# Patient Record
(demographics unavailable — no encounter records)

---

## 2024-11-11 NOTE — REVIEW OF SYSTEMS
[Chest Discomfort] : chest discomfort [Negative] : Heme/Lymph [Weight Gain (___ Lbs)] : no recent weight gain [Weight Loss (___ Lbs)] : [unfilled] ~Ulb weight loss [SOB] : no shortness of breath [Dyspnea on exertion] : not dyspnea during exertion [Lower Ext Edema] : no extremity edema [Leg Claudication] : no intermittent leg claudication [Palpitations] : no palpitations [Syncope] : no syncope

## 2024-11-11 NOTE — DISCUSSION/SUMMARY
[FreeTextEntry1] : The patient is an 82-year-old female DM, HTN, HLD, CAD, AS s/p aVR with weight loss secondary to caregiver stress. #1 CV- ECG unchanged, Pharm stress neg, #2 DM: HbA1c ok,trulicity, metformin and glimeperide , f/u endo #3 AS: AVR, ECHO next visit, c/w toprol 25mg to current 50mg and encourage hydration. #4 HTN: c/w metoprolol #5 HLD: c/w atorvastatin #6 General- antibiotic prophylaxis for dental work, received Pfizer vaccine, needs to start walking for exercise, stress mgmt. Dtr very involved. [EKG obtained to assist in diagnosis and management of assessed problem(s)] : EKG obtained to assist in diagnosis and management of assessed problem(s)

## 2024-11-11 NOTE — HISTORY OF PRESENT ILLNESS
[FreeTextEntry1] : Mary continues to be under stress with husbands progressive dementia. Losing weight from dietary changes and mild depression/stress as caregiver. Dtr with her and check on them often. No CP palpitations or SOB>

## 2024-11-11 NOTE — DISCUSSION/SUMMARY
[FreeTextEntry1] : The patient is an 82-year-old female DM, HTN, HLD, CAD, AS s/p aVR with weight loss secondary to caregiver stress. #1 CV- ECG unchanged, Pharm stress neg, #2 DM: HbA1c ok,trulicity, metformin and glimeperide , f/u endo #3 AS: AVR, ECHO next visit, c/w toprol 25mg to current 50mg and encourage hydration. #4 HTN: c/w metoprolol #5 HLD: c/w atorvastatin #6 General- antibiotic prophylaxis for dental work, received Pfizer vaccine, needs to start walking for exercise, stress mgmt. Dtr very involved. [EKG obtained to assist in diagnosis and management of assessed problem(s)] : EKG obtained to assist in diagnosis and management of assessed problem(s) General

## 2025-05-19 NOTE — HISTORY OF PRESENT ILLNESS
[FreeTextEntry1] : 83-year-old female DM, HTN, HLD, CAD, AS s/p aVR who had episode palpitation for 3 hours on April 10th.  has Alzheimer's and she is the only caregiver. Now with mild memory loss herself. Dtr only one who can help but her  also has Parkinsons.

## 2025-05-19 NOTE — REVIEW OF SYSTEMS
[Weight Loss (___ Lbs)] : [unfilled] ~Ulb weight loss [Chest Discomfort] : chest discomfort [Negative] : Heme/Lymph [Weight Gain (___ Lbs)] : no recent weight gain [SOB] : no shortness of breath [Dyspnea on exertion] : not dyspnea during exertion [Lower Ext Edema] : no extremity edema [Leg Claudication] : no intermittent leg claudication [Palpitations] : no palpitations [Syncope] : no syncope

## 2025-05-19 NOTE — DISCUSSION/SUMMARY
[FreeTextEntry1] : The patient is an 83-year-old female DM, HTN, HLD, CAD, AS s/p aVR with episode palpitations lasting 3 hours over a month ago.  #1 CV- ECG unchanged, Pharm stress neg, #2 DM: HbA1c ok, Trulicity, metformin and glimepiride, f/u endo #3 AS: AVR, ECHO next visit, c/w metoprolol 25mg to current 50mg and encourage hydration. if recurrent episode then event monitor, daughter will call.  #4 HTN: c/w metoprolol #5 HLD: c/w atorvastatin 20mg. fenofibrate 145mg. #6 General- antibiotic prophylaxis for dental work, received Pfizer vaccine, needs to start walking for exercise, stress mgmt. Dtr very involved. [EKG obtained to assist in diagnosis and management of assessed problem(s)] : EKG obtained to assist in diagnosis and management of assessed problem(s)